# Patient Record
Sex: MALE | Race: WHITE | URBAN - METROPOLITAN AREA
[De-identification: names, ages, dates, MRNs, and addresses within clinical notes are randomized per-mention and may not be internally consistent; named-entity substitution may affect disease eponyms.]

---

## 2018-02-13 NOTE — PROGRESS NOTES
SUBJECTIVE:   Rigo Sagastume is a 22 year old female who presents to clinic today for the following health issues:      ED/UC Follow up:    Facility:  [facility]  Date of visit: 1/29/18  Reason for visit: left forearm was numb, spasms in left chest and left back, intermittent chest pain, palpitations. EKG and Chest CT were both normal  Current Status: still has tightness in chest all the time, having frequent heart palpitations, finds himself drawing his left arm to his body, has pain to left shoulder blade and under left arm     Has noticed that if he drinks one beer his chest pain goes away completely.  Blood pressure has been 150's/85-90 for about one month. Cut aspartame out of his diet one week ago and readings dropped to 130/65.       Called and spoke with patient , sx are not new, has know hx of PVC's  Has had for years had pres w/u no significant findings  Currently has some chest discomfort  Has had SOB but not now  Denies pain arm or jaw pain  , no sweating   Does not sound distress on phone   Recently has more frequent sx and frequent PVC's    States 1 in 15 beats  Was seen in UC 2 weeks ago Normal EKG CT neg  advised to f/u with a  provider   Does have history of anxiety  But no panic attaches in last 2 weeks   Advised  ok to keep appointment   If sx change or worsen  To seek ED  Pt agreed  NOTE pt is moving out of state in 2 weeks   KERVIN Scanlon  Clinic  RN/Amol Osei      Problem list and histories reviewed & adjusted, as indicated.  Additional history: as documented    Current Outpatient Prescriptions   Medication Sig Dispense Refill     ASPIRIN PO Take 325 mg by mouth       metoprolol succinate (TOPROL-XL) 25 MG 24 hr tablet Take 1 tablet (25 mg) by mouth daily 30 tablet 1     Allergies   Allergen Reactions     Amoxicillin      Gabapentin      Penicillins        Reviewed and updated as needed this visit by clinical staff  Tobacco  Allergies  Meds  Problems  Med Hx   Surg Hx  Fam Hx  Soc Hx        Reviewed and updated as needed this visit by Provider  Problems          1 year ago in May found out that had PVC's 4% of the time. Had Holter monitor. May until Nov would not feel them at all. Since the end of Nov they have been increasing in frequency. In Dec started to have some chest tightness. Feeling it daily. Sometimes will be sore and then other times will be better. Will have fluttering for some time and then will be gone. Back in May was there but did not have any pain. Unsure what brings it on. Is active and does not drink alcohol or [place]. Used to be very active and then had vasculitis in brain 10/16 and was told to stop activity. Did see neurology at that time in [place], Dr. Restrepo. Stopped using marijuana. Blood pressure usually 105/60 now over the last month has been gradually increasing. 130-150 will be top number now. Stopped aspartame. Will not have shortness of breath with tightness in chest. Does get sweaty during the night but has not had during the day. No dizziness or light head. Grandparents had heart attacks. Mom does have PCV's. Did have an echo in May that was normal. Echo was in [place]. Did see cardiology. Was told that did not need to do anything about this, just needs to keep an eye on it. Has had blood tests done. Does have some anxiety but this seems to unrelated to the anxiety. At times feels like there is no air in the room. Has not been able to sleep. Hard time falling asleep and will wake up with pain and, squeezing and fluttering. Was seen in Urgent Care on 1/29/18 EKG and CT of chest was normal at that time. Pain is there almost all day. Does have times that will have the fluttering and no chest tightness and then there will be times that has both of them together.  Had been living in [place] but is here for 2 weeks and then will be moving to [place] for a new job.    Had positive TRINI in 10/2017 and also in 2016. Was minimally elevated.  "Has been told that needs to see rheumatology but has not set up an appointment yet.       ROS:  Review of Systems   Constitutional: Positive for diaphoresis (occ at night). Negative for chills, fatigue and fever.   HENT: Negative for ear discharge, ear pain, hearing loss, rhinorrhea, sinus pressure and sore throat.    Eyes: Negative for discharge, itching and visual disturbance.   Respiratory: Positive for chest tightness. Negative for cough, shortness of breath and wheezing.    Cardiovascular: Positive for palpitations. Negative for chest pain and leg swelling.   Gastrointestinal: Negative for abdominal distention, abdominal pain, constipation, diarrhea, nausea and vomiting.   Endocrine: Negative for cold intolerance and heat intolerance.   Musculoskeletal: Negative for arthralgias and myalgias.   Skin: Negative for rash.   Neurological: Negative for dizziness, light-headedness, numbness and headaches.         OBJECTIVE:     /70 (BP Location: Left arm, Patient Position: Sitting, Cuff Size: Adult Regular)  Pulse 96  Temp 97.2  F (36.2  C) (Tympanic)  Ht 5' 9.5\" (1.765 m)  Wt 153 lb (69.4 kg)  SpO2 97%  BMI 22.27 kg/m2  Body mass index is 22.27 kg/(m^2).  Physical Exam   Constitutional: He appears well-developed and well-nourished.   HENT:   Right Ear: Tympanic membrane and external ear normal.   Left Ear: Tympanic membrane and external ear normal.   Mouth/Throat: Uvula is midline, oropharynx is clear and moist and mucous membranes are normal.   Neck: Carotid bruit is not present. No thyromegaly present.   Cardiovascular: Normal rate and normal heart sounds.  A regularly irregular rhythm present.   Pulmonary/Chest: Effort normal and breath sounds normal.   Abdominal: Soft. Bowel sounds are normal.   Neurological: He is alert.   Skin: Skin is warm and dry.       ASSESSMENT/PLAN:   1. PVC's (premature ventricular contractions)  Previous EKG, labs and imaging reviewed.  Does not have a copy of Holter monitor " or echo with him.  We will plan to start on low-dose metoprolol today.  Educated regarding warning signs to watch for.  Educated on blood pressure and heart rate and when would need to hold medication.  Should establish care with new provider in [place].  Recommend repeat Holter monitor at minimum.  Educated and reviewed warning signs to watch for and when would need to seek immediate medical attention.  - metoprolol succinate (TOPROL-XL) 25 MG 24 hr tablet; Take 1 tablet (25 mg) by mouth daily  Dispense: 30 tablet; Refill: 1    2. Cerebral vasculitis  Is wanting to resume activity.  Encouraged to follow-up with neurology.    3. Positive TRINI (antinuclear antibody)  Encouraged to establish with new provider in [place] for further workup    4. Anxiety  No anxiety today.  Seems noncontributory.          LORENZA Hyman [facility]

## 2018-02-14 ENCOUNTER — OFFICE VISIT (OUTPATIENT)
Dept: FAMILY MEDICINE | Facility: CLINIC | Age: 23
End: 2018-02-14
Payer: COMMERCIAL

## 2018-02-14 VITALS
HEIGHT: 70 IN | TEMPERATURE: 97.2 F | WEIGHT: 153 LBS | DIASTOLIC BLOOD PRESSURE: 70 MMHG | HEART RATE: 96 BPM | SYSTOLIC BLOOD PRESSURE: 132 MMHG | OXYGEN SATURATION: 97 % | BODY MASS INDEX: 21.9 KG/M2

## 2018-02-14 DIAGNOSIS — R76.8 POSITIVE ANA (ANTINUCLEAR ANTIBODY): ICD-10-CM

## 2018-02-14 DIAGNOSIS — F41.9 ANXIETY: ICD-10-CM

## 2018-02-14 DIAGNOSIS — I49.3 PVC'S (PREMATURE VENTRICULAR CONTRACTIONS): Primary | ICD-10-CM

## 2018-02-14 DIAGNOSIS — I67.7 CEREBRAL VASCULITIS: ICD-10-CM

## 2018-02-14 PROCEDURE — 99204 OFFICE O/P NEW MOD 45 MIN: CPT | Performed by: NURSE PRACTITIONER

## 2018-02-14 RX ORDER — METOPROLOL SUCCINATE 25 MG/1
25 TABLET, EXTENDED RELEASE ORAL DAILY
Qty: 30 TABLET | Refills: 1 | Status: SHIPPED | OUTPATIENT
Start: 2018-02-14 | End: 2018-04-11

## 2018-02-14 ASSESSMENT — ENCOUNTER SYMPTOMS
FEVER: 0
CHEST TIGHTNESS: 1
ARTHRALGIAS: 0
SORE THROAT: 0
NUMBNESS: 0
HEADACHES: 0
ABDOMINAL DISTENTION: 0
DIZZINESS: 0
CONSTIPATION: 0
EYE DISCHARGE: 0
MYALGIAS: 0
ABDOMINAL PAIN: 0
EYE ITCHING: 0
RHINORRHEA: 0
LIGHT-HEADEDNESS: 0
DIAPHORESIS: 1
SINUS PRESSURE: 0
DIARRHEA: 0
NAUSEA: 0
COUGH: 0
FATIGUE: 0
WHEEZING: 0
VOMITING: 0
PALPITATIONS: 1
SHORTNESS OF BREATH: 0
CHILLS: 0

## 2018-02-14 NOTE — MR AVS SNAPSHOT
"              After Visit Summary   2/14/2018    Rigo Sagastume    MRN: [id number]           Patient Information     Date Of Birth          1995        Visit Information        Provider Department      2/14/2018 10:20 AM Luz Kaur APRN [facility]        Today's Diagnoses     PVC's (premature ventricular contractions)    -  1      Care Instructions    If blood pressure is less than 110/70 do not take med    If your pulse is less than 60 do not take it.     Please establish care with new provider in [place] for follow up on PVC's and positive TRINI.           Follow-ups after your visit        Who to contact     Normal or non-critical lab and imaging results will be communicated to you by [employer], letter or phone within 4 business days after the clinic has received the results. If you do not hear from us within 7 days, please contact the clinic through MyChart or phone. If you have a critical or abnormal lab result, we will notify you by phone as soon as possible.  Submit refill requests through [employer] or call your pharmacy and they will forward the refill request to us. Please allow 3 business days for your refill to be completed.          If you need to speak with a  for additional information , please call: [phone]           Additional Information About Your Visit        [employer] Information     [employer] lets you send messages to your doctor, view your test results, renew your prescriptions, schedule appointments and more. To sign up, go to [email or web address] . Click on \"Log in\" on the left side of the screen, which will take you to the Welcome page. Then click on \"Sign up Now\" on the right side of the page.     You will be asked to enter the access code listed below, as well as some personal information. Please follow the directions to create your username and password.     Your access code is: [email or web address]  Expires: 5/15/2018 10:54 AM     Your access " "code will  in 90 days. If you need help or a new code, please call your [facility] clinic or [phone].        Care EveryWhere ID     This is your Care EveryWhere ID. This could be used by other organizations to access your [facility] medical records  [id number]        Your Vitals Were     Pulse Temperature Height Pulse Oximetry BMI (Body Mass Index)       96 97.2  F (36.2  C) (Tympanic) 5' 9.5\" (1.765 m) 97% 22.27 kg/m2        Blood Pressure from Last 3 Encounters:   18 132/70    Weight from Last 3 Encounters:   18 153 lb (69.4 kg)              Today, you had the following     No orders found for display         Today's Medication Changes          These changes are accurate as of 18 10:54 AM.  If you have any questions, ask your nurse or doctor.               Start taking these medicines.        Dose/Directions    metoprolol succinate 25 MG 24 hr tablet   Commonly known as:  TOPROL-XL   Used for:  PVC's (premature ventricular contractions)   Started by:  Luz Kaur APRN CNP        Dose:  25 mg   Take 1 tablet (25 mg) by mouth daily   Quantity:  30 tablet   Refills:  1            Where to get your medicines      These medications were sent to [facility] [address] [address]     Phone:  [phone]     metoprolol succinate 25 MG 24 hr tablet                Primary Care Provider Fax #    Physician No Ref-Primary [phone]       No address on file        Equal Access to Services     SHAWNEE SAHU : Hadii tracey ku hadasho Soomaali, waaxda luqadaha, qaybta kaalmada adeegyada, esther stevenson. So [facility] [phone].    ATENCIÓN: Si habla español, tiene a victoria disposición servicios gratuitos de asistencia lingüística. Llame al [phone].    We comply with applicable federal civil rights laws and [place] laws. We do not discriminate on the basis of race, color, national origin, age, " disability, sex, sexual orientation, or gender identity.            Thank you!     Thank you for choosing [facility]  for your care. Our goal is always to provide you with excellent care. Hearing back from our patients is one way we can continue to improve our services. Please take a few minutes to complete the written survey that you may receive in the mail after your visit with us. Thank you!             Your Updated Medication List - Protect others around you: Learn how to safely use, store and throw away your medicines at www.disposemymeds.org.          This list is accurate as of 2/14/18 10:54 AM.  Always use your most recent med list.                   Brand Name Dispense Instructions for use Diagnosis    ASPIRIN PO      Take 325 mg by mouth        metoprolol succinate 25 MG 24 hr tablet    TOPROL-XL    30 tablet    Take 1 tablet (25 mg) by mouth daily    PVC's (premature ventricular contractions)

## 2018-02-14 NOTE — NURSING NOTE
"Chief Complaint   Patient presents with     Palpitations       Initial /70 (BP Location: Left arm, Patient Position: Sitting, Cuff Size: Adult Regular)  Pulse 96  Temp 97.2  F (36.2  C) (Tympanic)  Ht 5' 9.5\" (1.765 m)  Wt 153 lb (69.4 kg)  SpO2 97%  BMI 22.27 kg/m2 Estimated body mass index is 22.27 kg/(m^2) as calculated from the following:    Height as of this encounter: 5' 9.5\" (1.765 m).    Weight as of this encounter: 153 lb (69.4 kg).  Medication Reconciliation: complete     April STEVE Peterson      "

## 2018-02-14 NOTE — PATIENT INSTRUCTIONS
If blood pressure is less than 110/70 do not take med    If your pulse is less than 60 do not take it.     Please establish care with new provider in [place] for follow up on PVC's and positive TRINI.

## 2018-04-11 ENCOUNTER — TELEPHONE (OUTPATIENT)
Dept: FAMILY MEDICINE | Facility: CLINIC | Age: 23
End: 2018-04-11

## 2018-04-11 DIAGNOSIS — I49.3 PVC'S (PREMATURE VENTRICULAR CONTRACTIONS): ICD-10-CM

## 2018-04-11 RX ORDER — METOPROLOL SUCCINATE 25 MG/1
25 TABLET, EXTENDED RELEASE ORAL DAILY
Qty: 30 TABLET | Refills: 1 | Status: SHIPPED | OUTPATIENT
Start: 2018-04-11

## 2018-04-11 NOTE — TELEPHONE ENCOUNTER
Pt requesting refill of metoprolol to  pharmacy. Pt has enough to last til Monday 4/16.    Fawn Bowen, [place]

## 2018-04-11 NOTE — TELEPHONE ENCOUNTER
Called and spoke with patient   He has gotten moved around by his work, is now in [place]  States he is doing well on Metoprolol , no sx  Need refill till he can get establish with [facility],   RX sent   Advised to seek ED if sx present  Or as need  KERVIN Scanlon  Clinic  RN/Amol Osei